# Patient Record
Sex: FEMALE | Race: BLACK OR AFRICAN AMERICAN | Employment: OTHER | URBAN - METROPOLITAN AREA
[De-identification: names, ages, dates, MRNs, and addresses within clinical notes are randomized per-mention and may not be internally consistent; named-entity substitution may affect disease eponyms.]

---

## 2019-03-27 ENCOUNTER — OFFICE VISIT (OUTPATIENT)
Dept: ORTHOPEDIC SURGERY | Age: 74
End: 2019-03-27

## 2019-03-27 VITALS
BODY MASS INDEX: 31.1 KG/M2 | HEIGHT: 62 IN | OXYGEN SATURATION: 98 % | SYSTOLIC BLOOD PRESSURE: 153 MMHG | WEIGHT: 169 LBS | DIASTOLIC BLOOD PRESSURE: 81 MMHG | HEART RATE: 67 BPM | TEMPERATURE: 98.2 F

## 2019-03-27 DIAGNOSIS — M17.11 PRIMARY OSTEOARTHRITIS OF RIGHT KNEE: Primary | ICD-10-CM

## 2019-03-27 RX ORDER — ATORVASTATIN CALCIUM 40 MG/1
40 TABLET, FILM COATED ORAL DAILY
COMMUNITY
Start: 2019-03-18

## 2019-03-27 RX ORDER — CETIRIZINE HCL 10 MG
10 TABLET ORAL
COMMUNITY
Start: 2007-12-28

## 2019-03-27 RX ORDER — OXYBUTYNIN CHLORIDE 5 MG/1
TABLET ORAL AS NEEDED
COMMUNITY
Start: 2019-01-04

## 2019-03-27 RX ORDER — PANTOPRAZOLE SODIUM 40 MG/1
40 TABLET, DELAYED RELEASE ORAL
COMMUNITY
Start: 2007-12-28

## 2019-03-27 RX ORDER — AZITHROMYCIN 250 MG/1
TABLET, FILM COATED ORAL
COMMUNITY
Start: 2019-01-09 | End: 2020-03-06

## 2019-03-27 RX ORDER — LOSARTAN POTASSIUM 50 MG/1
50 TABLET ORAL DAILY
COMMUNITY
Start: 2019-03-18

## 2019-03-27 RX ORDER — ERGOCALCIFEROL 1.25 MG/1
50000 CAPSULE ORAL
COMMUNITY

## 2019-03-27 RX ORDER — TRIAMCINOLONE ACETONIDE 40 MG/ML
40 INJECTION, SUSPENSION INTRA-ARTICULAR; INTRAMUSCULAR ONCE
Qty: 1 ML | Refills: 0
Start: 2019-03-27 | End: 2019-03-27

## 2019-03-27 RX ORDER — ETODOLAC 400 MG/1
400 TABLET, FILM COATED ORAL
COMMUNITY
Start: 2007-12-28 | End: 2020-03-06

## 2019-03-27 RX ORDER — FUROSEMIDE 20 MG/1
20 TABLET ORAL
COMMUNITY

## 2019-03-27 RX ORDER — MELOXICAM 7.5 MG/1
TABLET ORAL
COMMUNITY
Start: 2019-03-18 | End: 2020-03-06

## 2019-03-27 NOTE — PROGRESS NOTES
David Layne  1945   Chief Complaint   Patient presents with    Knee Pain     right        HISTORY OF PRESENT ILLNESS  David Layne is a 74 y.o. female who presents today for evaluation of right knee pain. She rates her pain 10/10 today. Pain has been present for 3 weeks, denies injury. She states she is walking with a limp. She reports a popping sensation at times in the knee. Patient describes the pain as throbbing that is Intermittent in nature. Symptoms are worse with walking and standing, Activity and is better with  Rest. Associated symptoms include Swelling. Since problem started, it: is unchanged. Pain does not wake patient up at night. Has taken no meds for the problem. Has tried following treatments: Injections:NO; Brace:NO;  Therapy:NO; Cane/Crutch:NO       No Known Allergies     Past Medical History:   Diagnosis Date    GERD (gastroesophageal reflux disease)     Hypercholesteremia       Social History     Socioeconomic History    Marital status: UNKNOWN     Spouse name: Not on file    Number of children: Not on file    Years of education: Not on file    Highest education level: Not on file   Occupational History    Not on file   Social Needs    Financial resource strain: Not on file    Food insecurity:     Worry: Not on file     Inability: Not on file    Transportation needs:     Medical: Not on file     Non-medical: Not on file   Tobacco Use    Smoking status: Never Smoker    Smokeless tobacco: Never Used   Substance and Sexual Activity    Alcohol use: Never     Frequency: Never    Drug use: Never    Sexual activity: Not on file   Lifestyle    Physical activity:     Days per week: Not on file     Minutes per session: Not on file    Stress: Not on file   Relationships    Social connections:     Talks on phone: Not on file     Gets together: Not on file     Attends Sabianist service: Not on file     Active member of club or organization: Not on file     Attends meetings of clubs or organizations: Not on file     Relationship status: Not on file    Intimate partner violence:     Fear of current or ex partner: Not on file     Emotionally abused: Not on file     Physically abused: Not on file     Forced sexual activity: Not on file   Other Topics Concern    Not on file   Social History Narrative    Not on file      Past Surgical History:   Procedure Laterality Date    HX HYSTERECTOMY        History reviewed. No pertinent family history. Current Outpatient Medications   Medication Sig    atorvastatin (LIPITOR) 40 mg tablet     azithromycin (ZITHROMAX) 250 mg tablet     etodolac (LODINE) 400 mg tablet Take 400 mg by mouth.  losartan (COZAAR) 50 mg tablet     meloxicam (MOBIC) 7.5 mg tablet     cetirizine (ZYRTEC) 10 mg tablet Take 10 mg by mouth.  oxybutynin (DITROPAN) 5 mg tablet     pantoprazole (PROTONIX) 40 mg tablet Take 40 mg by mouth.  furosemide (LASIX) 10 mg/mL oral solution Take  by mouth daily.  ergocalciferol (VITAMIN D2) 50,000 unit capsule Take 50,000 Units by mouth. No current facility-administered medications for this visit. REVIEW OF SYSTEM   Patient denies: Weight loss, Fever/Chills, HA, Visual changes, Fatigue, Chest pain, SOB, Abdominal pain, N/V/D/C, Blood in stool or urine, Edema. Pertinent positive as above in HPI. All others were negative    PHYSICAL EXAM:   Visit Vitals  /81   Pulse 67   Temp 98.2 °F (36.8 °C) (Oral)   Ht 5' 2\" (1.575 m)   Wt 169 lb (76.7 kg)   SpO2 98%   BMI 30.91 kg/m²     The patient is a well-developed, well-nourished female   in no acute distress. The patient is alert and oriented times three. The patient is alert and oriented times three. Mood and affect are normal.  LYMPHATIC: lymph nodes are not enlarged and are within normal limits  SKIN: normal in color and non tender to palpation. There are no bruises or abrasions noted. NEUROLOGICAL: Motor sensory exam is within normal limits. Reflexes are equal bilaterally. There is normal sensation to pinprick and light touch  MUSCULOSKELETAL:  Examination Right knee   Skin Intact   Range of motion 0-130   Effusion +   Medial joint line tenderness +   Lateral joint line tenderness -   Tenderness Pes Bursa -   Tenderness insertion MCL -   Tenderness insertion LCL -   Leifs -   Patella crepitus -   Patella grind -   Lachman -   Pivot shift -   Anterior drawer -   Posterior drawer -   Varus stress -   Valgus stress -   Neurovascular Intact   Calf Swelling and Tenderness to Palpation -   Rosalba's Test -   Hamstring Cord Tightness -     PROCEDURE: Right Knee Injection with Ultrasound Guidance  Indication:Right Knee pain/swelling    After sterile prep, 4 cc of Xylocaine and 1 cc of Kenalog were injected into the right knee. Ultrasound images captured using [employer] Ultrasound machine and scanned into patient's chart.        [facility]  OFFICE PROCEDURE PROGRESS NOTE        Chart reviewed for the following:  Deyanira Gonzales M.D, have reviewed the History, Physical and updated the Allergic reactions for [address] performed immediately prior to start of procedure:  Deyanira Gonzales M.D, have performed the following reviews on [place] [address] prior to the start of the procedure:            * Patient was identified by name and date of birth   * Agreement on procedure being performed was verified  * Risks and Benefits explained to the patient  * Procedure site verified and marked as necessary  * Patient was positioned for comfort  * Consent was signed and verified     Time: 9:53 AM     Date of procedure: 3/27/2019    Procedure performed by:  Sue Contreras M.D    Provider assisted by: (see medication administration)    How tolerated by patient: tolerated the procedure well with no complications    Comments: none    IMAGING: XR of right knee dated 3/15/19 was reviewed and read: IMPRESSION:  1. Negative for fracture. 2. Mild osteoarthritis of the right knee. IMPRESSION:      ICD-10-CM ICD-9-CM    1. Primary osteoarthritis of right knee M17.11 715.16 TRIAMCINOLONE ACETONIDE INJ      triamcinolone acetonide (KENALOG) 40 mg/mL injection      US GUIDE INJ/ASP/ARTHRO LG JNT/BURSA        PLAN:  1. The patient presents today with right knee pain due to osteoarthritis and I would like to try a cortisone injection today. Risk factors include: n/a  2. No ultrasound exam indicated today  3. Yes cortisone injection indicated today R KNEE US  4. No Physical/Occupational Therapy indicated today  5. No diagnostic test indicated today:   6. No durable medical equipment indicated today  7. No referral indicated today   8. No medications indicated today:   9. No Narcotic indicated today    RTC 3 weeks if pain continues    Scribed by Karen Harp ([address]) as dictated by Dasha Aviles MD    I, Dr. Dasha Aviles, confirm that all documentation is accurate.     Dasha Aviles M.D.   [facility]

## 2019-04-16 ENCOUNTER — OFFICE VISIT (OUTPATIENT)
Dept: ORTHOPEDIC SURGERY | Age: 74
End: 2019-04-16

## 2019-04-16 VITALS
BODY MASS INDEX: 31.47 KG/M2 | TEMPERATURE: 98 F | RESPIRATION RATE: 16 BRPM | DIASTOLIC BLOOD PRESSURE: 85 MMHG | SYSTOLIC BLOOD PRESSURE: 148 MMHG | HEIGHT: 62 IN | WEIGHT: 171 LBS | HEART RATE: 77 BPM | OXYGEN SATURATION: 98 %

## 2019-04-16 DIAGNOSIS — S83.8X1A MENISCAL INJURY, RIGHT, INITIAL ENCOUNTER: Primary | ICD-10-CM

## 2019-04-16 DIAGNOSIS — M17.11 PRIMARY OSTEOARTHRITIS OF RIGHT KNEE: ICD-10-CM

## 2019-04-16 NOTE — PROGRESS NOTES
Hans Cuevas7 1945 Chief Complaint Patient presents with  Knee Pain  
  right knee f/u HISTORY OF PRESENT ILLNESS Hans Pacheco is a 74 y.o. female who presents today for reevaluation of right knee pain. Patient rates pain as 6/10 today. At last OV, patient had a cortisone injection which provided some relief. Pain has been present for 6 weeks, denies injury. She states she is walking with a limp. She reports a popping sensation at times in the knee. Patient denies any fever, chills, chest pain, shortness of breath or calf pain. The remainder of the review of systems is negative. There are no new illness or injuries to report since last seen in the office. There are no changes to medications, allergies, family or social history. Pain Assessment  4/16/2019 Location of Pain Knee Location Modifiers Right Severity of Pain 6 Quality of Pain Dull;Aching Duration of Pain A few hours Frequency of Pain Intermittent Aggravating Factors Walking;Standing Limiting Behavior -  
Relieving Factors Rest;Elevation Result of Injury No  
 
PHYSICAL EXAM:  
Visit Vitals /85 Pulse 77 Temp 98 °F (36.7 °C) (Oral) Resp 16 Ht 5' 2\" (1.575 m) Wt 171 lb (77.6 kg) SpO2 98% BMI 31.28 kg/m² The patient is a well-developed, well-nourished female   in no acute distress. The patient is alert and oriented times three. The patient is alert and oriented times three. Mood and affect are normal. 
LYMPHATIC: lymph nodes are not enlarged and are within normal limits SKIN: normal in color and non tender to palpation. There are no bruises or abrasions noted. NEUROLOGICAL: Motor sensory exam is within normal limits. Reflexes are equal bilaterally. There is normal sensation to pinprick and light touch MUSCULOSKELETAL: 
Examination Right knee Skin Intact Range of motion 0-130 Effusion + Medial joint line tenderness + Lateral joint line tenderness -  
 Tenderness Pes Bursa - Tenderness insertion MCL - Tenderness insertion LCL - Leifs -  
Patella crepitus - Patella grind - Lachman - Pivot shift - Anterior drawer - Posterior drawer -  
Varus stress -  
Valgus stress - Neurovascular Intact Calf Swelling and Tenderness to Palpation -  
Rosalba's Test -  
Hamstring Cord Tightness -  
  
IMAGING: XR of right knee dated 3/15/19 was reviewed and read: IMPRESSION: 
1. Negative for fracture. 2. Mild osteoarthritis of the right knee. IMPRESSION:   
  ICD-10-CM ICD-9-CM 1. Meniscal injury, right, initial encounter S83. 8X1A 959.7 MRI KNEE RT WO CONT 2. Primary osteoarthritis of right knee M17.11 715.16 PLAN:  
1. The patient presents today with right knee pain not helped by the cortisone injection and I would like to get an MRI to r/o a meniscal tear. Risk factors include: n/a 2. No ultrasound exam indicated today 3. No cortisone injection indicated today 4. No Physical/Occupational Therapy indicated today 5. Yes diagnostic test indicated today: MRI R KNEE 6. No durable medical equipment indicated today 7. No referral indicated today 8. No medications indicated today: 9. No Narcotic indicated today RTC following MRI Scribed by Vania Rdz ([address]) as dictated by Sunshine Frye MD 
 
I, Dr. Sunshine Frye, confirm that all documentation is accurate.  
 
Sunshine Frye M.D.  
[facility]

## 2020-02-12 ENCOUNTER — OFFICE VISIT (OUTPATIENT)
Dept: ORTHOPEDIC SURGERY | Facility: CLINIC | Age: 75
End: 2020-02-12

## 2020-02-12 VITALS
RESPIRATION RATE: 18 BRPM | SYSTOLIC BLOOD PRESSURE: 130 MMHG | OXYGEN SATURATION: 98 % | BODY MASS INDEX: 31.8 KG/M2 | HEIGHT: 62 IN | WEIGHT: 172.8 LBS | HEART RATE: 71 BPM | DIASTOLIC BLOOD PRESSURE: 70 MMHG | TEMPERATURE: 98 F

## 2020-02-12 DIAGNOSIS — R22.32 SUBCUTANEOUS MASS OF LEFT THUMB: Primary | ICD-10-CM

## 2020-02-12 NOTE — PROGRESS NOTES
Gurwinder Morris is a 66 y.o. female right handed retiree. Worker's Compensation and legal considerations: none filed. Vitals:    02/12/20 1254   BP: 130/70   Pulse: 71   Resp: 18   Temp: 98 °F (36.7 °C)   TempSrc: Oral   SpO2: 98%   Weight: 172 lb 12.8 oz (78.4 kg)   Height: 5' 2\" (1.575 m)   PainSc:   0 - No pain   PainLoc: Hand           Chief Complaint   Patient presents with    Hand Pain     Left Thumb         HPI: Patient comes in today with complaints of a mass over her left thumb. She is unsure how long it is been there but she says she has had a similar mass in her genitalia as well as she notices one appearing on the inside of her lip. Date of onset:  indeterminate    Injury: No    Prior Treatment:  No    Numbness/ Tingling: No    ROS: Review of Systems - General ROS: negative  Respiratory ROS: no cough, shortness of breath, or wheezing  Cardiovascular ROS: no chest pain or dyspnea on exertion  Musculoskeletal ROS: positive for - mass left thumb  Neurological ROS: negative  Dermatological ROS: negative    Past Medical History:   Diagnosis Date    GERD (gastroesophageal reflux disease)     Hypercholesteremia     Hypertension        Past Surgical History:   Procedure Laterality Date    HX HYSTERECTOMY         Current Outpatient Medications   Medication Sig Dispense Refill    atorvastatin (LIPITOR) 40 mg tablet       losartan (COZAAR) 50 mg tablet       oxybutynin (DITROPAN) 5 mg tablet       pantoprazole (PROTONIX) 40 mg tablet Take 40 mg by mouth.  furosemide (LASIX) 10 mg/mL oral solution Take  by mouth daily.  ergocalciferol (VITAMIN D2) 50,000 unit capsule Take 50,000 Units by mouth.  azithromycin (ZITHROMAX) 250 mg tablet       etodolac (LODINE) 400 mg tablet Take 400 mg by mouth.  meloxicam (MOBIC) 7.5 mg tablet       cetirizine (ZYRTEC) 10 mg tablet Take 10 mg by mouth.          No Known Allergies      PE:     Left Hand: There is a round wartlike appearing mass over the dorsal aspect of the thumb. Deep to this there appears to be another mass which may be cystic in nature versus solid. Imaging:     Plain films of left thumb does not show any osseous abnormality although there are degenerative changes noted at the thumb [facility]. ICD-10-CM ICD-9-CM    1. Subcutaneous mass of left thumb R22.32 782.2 AMB POC XRAY, FINGER(S), 2+ VIEWS       Plan: At this point we will set her up for a left thumb mass excision pending any medical clearances or labs. Follow-up 2 weeks postop.     Plan was reviewed with patient, who verbalized agreement and understanding of the plan

## 2020-02-25 ENCOUNTER — HOSPITAL ENCOUNTER (OUTPATIENT)
Dept: LAB | Age: 75
Discharge: HOME | End: 2020-02-25
Payer: MEDICARE

## 2020-02-25 DIAGNOSIS — Z01.818 PREOP EXAMINATION: ICD-10-CM

## 2020-02-25 DIAGNOSIS — Z01.818 PREOP EXAMINATION: Primary | ICD-10-CM

## 2020-02-25 LAB
ALBUMIN SERPL-MCNC: 3.8 G/DL (ref 3.4–5)
ALBUMIN/GLOB SERPL: 1.2 {RATIO} (ref 0.8–1.7)
ALP SERPL-CCNC: 109 U/L (ref 45–117)
ALT SERPL-CCNC: 22 U/L (ref 13–56)
ANION GAP SERPL CALC-SCNC: 4 MMOL/L (ref 3–18)
AST SERPL-CCNC: 18 U/L (ref 10–38)
BASOPHILS # BLD: 0 K/UL (ref 0–0.1)
BASOPHILS NFR BLD: 0 % (ref 0–2)
BILIRUB SERPL-MCNC: 0.5 MG/DL (ref 0.2–1)
BUN SERPL-MCNC: 14 MG/DL (ref 7–18)
BUN/CREAT SERPL: 16 (ref 12–20)
CALCIUM SERPL-MCNC: 9.4 MG/DL (ref 8.5–10.1)
CHLORIDE SERPL-SCNC: 110 MMOL/L (ref 100–111)
CO2 SERPL-SCNC: 29 MMOL/L (ref 21–32)
CREAT SERPL-MCNC: 0.89 MG/DL (ref 0.6–1.3)
DIFFERENTIAL METHOD BLD: ABNORMAL
EOSINOPHIL # BLD: 0 K/UL (ref 0–0.4)
EOSINOPHIL NFR BLD: 1 % (ref 0–5)
ERYTHROCYTE [DISTWIDTH] IN BLOOD BY AUTOMATED COUNT: 14.3 % (ref 11.6–14.5)
GLOBULIN SER CALC-MCNC: 3.2 G/DL (ref 2–4)
GLUCOSE SERPL-MCNC: 76 MG/DL (ref 74–99)
HCT VFR BLD AUTO: 38.8 % (ref 35–45)
HGB BLD-MCNC: 12.7 G/DL (ref 12–16)
LYMPHOCYTES # BLD: 2.2 K/UL (ref 0.9–3.6)
LYMPHOCYTES NFR BLD: 34 % (ref 21–52)
MCH RBC QN AUTO: 31 PG (ref 24–34)
MCHC RBC AUTO-ENTMCNC: 32.7 G/DL (ref 31–37)
MCV RBC AUTO: 94.6 FL (ref 74–97)
MONOCYTES # BLD: 0.4 K/UL (ref 0.05–1.2)
MONOCYTES NFR BLD: 6 % (ref 3–10)
NEUTS SEG # BLD: 3.9 K/UL (ref 1.8–8)
NEUTS SEG NFR BLD: 59 % (ref 40–73)
PLATELET # BLD AUTO: 258 K/UL (ref 135–420)
PMV BLD AUTO: 11.1 FL (ref 9.2–11.8)
POTASSIUM SERPL-SCNC: 4.3 MMOL/L (ref 3.5–5.5)
PROT SERPL-MCNC: 7 G/DL (ref 6.4–8.2)
RBC # BLD AUTO: 4.1 M/UL (ref 4.2–5.3)
SODIUM SERPL-SCNC: 143 MMOL/L (ref 136–145)
WBC # BLD AUTO: 6.6 K/UL (ref 4.6–13.2)

## 2020-02-25 PROCEDURE — 85025 COMPLETE CBC W/AUTO DIFF WBC: CPT

## 2020-02-25 PROCEDURE — 80053 COMPREHEN METABOLIC PANEL: CPT

## 2020-02-25 PROCEDURE — 36415 COLL VENOUS BLD VENIPUNCTURE: CPT

## 2020-03-09 NOTE — H&P
Brooks Chandler is a 66 y.o. female right handed retiree. Worker's Compensation and legal considerations: none filed.         Vitals:     02/12/20 1254   BP: 130/70   Pulse: 71   Resp: 18   Temp: 98 °F (36.7 °C)   TempSrc: Oral   SpO2: 98%   Weight: 172 lb 12.8 oz (78.4 kg)   Height: 5' 2\" (1.575 m)   PainSc:   0 - No pain   PainLoc: Hand                    Chief Complaint   Patient presents with    Hand Pain       Left Thumb            HPI: Patient comes in today with complaints of a mass over her left thumb. She is unsure how long it is been there but she says she has had a similar mass in her genitalia as well as she notices one appearing on the inside of her lip.     Date of onset:  indeterminate     Injury: No     Prior Treatment:  No     Numbness/ Tingling:  No     ROS: Review of Systems - General ROS: negative  Respiratory ROS: no cough, shortness of breath, or wheezing  Cardiovascular ROS: no chest pain or dyspnea on exertion  Musculoskeletal ROS: positive for - mass left thumb  Neurological ROS: negative  Dermatological ROS: negative          Past Medical History:   Diagnosis Date    GERD (gastroesophageal reflux disease)      Hypercholesteremia      Hypertension           Surgical History         Past Surgical History:   Procedure Laterality Date    HX HYSTERECTOMY                       Current Outpatient Medications   Medication Sig Dispense Refill    atorvastatin (LIPITOR) 40 mg tablet          losartan (COZAAR) 50 mg tablet          oxybutynin (DITROPAN) 5 mg tablet          pantoprazole (PROTONIX) 40 mg tablet Take 40 mg by mouth.        furosemide (LASIX) 10 mg/mL oral solution Take  by mouth daily.        ergocalciferol (VITAMIN D2) 50,000 unit capsule Take 50,000 Units by mouth.        azithromycin (ZITHROMAX) 250 mg tablet          etodolac (LODINE) 400 mg tablet Take 400 mg by mouth.        meloxicam (MOBIC) 7.5 mg tablet          cetirizine (ZYRTEC) 10 mg tablet Take 10 mg by mouth.             No Known Allergies        PE:     Physical Exam  Constitutional:       General: She is not in acute distress. Appearance: Normal appearance. She is not ill-appearing, toxic-appearing or diaphoretic. HENT:      Head: Normocephalic and atraumatic. Nose: Nose normal.   Eyes:      Pupils: Pupils are equal, round, and reactive to light. Neck:      Musculoskeletal: Normal range of motion and neck supple. Cardiovascular:      Pulses: Normal pulses. Pulmonary:      Effort: Pulmonary effort is normal. No respiratory distress. Abdominal:      General: Abdomen is flat. There is no distension. Musculoskeletal: Normal range of motion. General: Tenderness and deformity present. No swelling or signs of injury. Right lower leg: No edema. Left lower leg: No edema. Skin:     General: Skin is warm. Capillary Refill: Capillary refill takes less than 2 seconds. Coloration: Skin is not jaundiced or pale. Findings: No bruising, erythema, lesion or rash. Neurological:      General: No focal deficit present. Mental Status: She is alert and oriented to person, place, and time. Mental status is at baseline. Cranial Nerves: No cranial nerve deficit. Sensory: No sensory deficit. Motor: No weakness. Coordination: Coordination normal.      Gait: Gait normal.      Deep Tendon Reflexes: Reflexes normal.   Psychiatric:         Mood and Affect: Mood normal.         Behavior: Behavior normal.         Thought Content: Thought content normal.         Judgment: Judgment normal.          Left Hand: There is a round wartlike appearing mass over the dorsal aspect of the thumb. Deep to this there appears to be another mass which may be cystic in nature versus solid.       Imaging:      Plain films of left thumb does not show any osseous abnormality although there are degenerative changes noted at the thumb [facility].               ICD-10-CM ICD-9-CM     1. Subcutaneous mass of left thumb R22.32 782.2 AMB POC XRAY, FINGER(S), 2+ VIEWS         Plan:      At this point we will set her up for a left thumb mass excision pending any medical clearances or labs.     Follow-up 2 weeks postop.     Plan was reviewed with patient, who verbalized agreement and understanding of the plan

## 2020-03-10 ENCOUNTER — HOSPITAL ENCOUNTER (OUTPATIENT)
Age: 75
Setting detail: OUTPATIENT SURGERY
Discharge: HOME | End: 2020-03-10
Attending: ORTHOPAEDIC SURGERY | Admitting: ORTHOPAEDIC SURGERY
Payer: MEDICARE

## 2020-03-10 VITALS — BODY MASS INDEX: 31.28 KG/M2 | WEIGHT: 170 LBS | HEIGHT: 62 IN

## 2020-03-10 PROCEDURE — 74011250636 HC RX REV CODE- 250/636: Performed by: NURSE ANESTHETIST, CERTIFIED REGISTERED

## 2020-03-10 RX ORDER — SODIUM CHLORIDE 0.9 % (FLUSH) 0.9 %
5-40 SYRINGE (ML) INJECTION EVERY 8 HOURS
Status: DISCONTINUED | OUTPATIENT
Start: 2020-03-10 | End: 2020-03-10 | Stop reason: HOSPADM

## 2020-03-10 RX ORDER — LIDOCAINE HYDROCHLORIDE 10 MG/ML
0.1 INJECTION, SOLUTION EPIDURAL; INFILTRATION; INTRACAUDAL; PERINEURAL AS NEEDED
Status: DISCONTINUED | OUTPATIENT
Start: 2020-03-10 | End: 2020-03-10 | Stop reason: HOSPADM

## 2020-03-10 RX ORDER — SODIUM CHLORIDE 0.9 % (FLUSH) 0.9 %
5-40 SYRINGE (ML) INJECTION AS NEEDED
Status: DISCONTINUED | OUTPATIENT
Start: 2020-03-10 | End: 2020-03-10 | Stop reason: HOSPADM

## 2020-03-10 RX ORDER — CEFAZOLIN SODIUM 2 G/50ML
2 SOLUTION INTRAVENOUS ONCE
Status: DISCONTINUED | OUTPATIENT
Start: 2020-03-10 | End: 2020-03-10 | Stop reason: HOSPADM

## 2020-03-10 RX ORDER — FAMOTIDINE 20 MG/1
20 TABLET, FILM COATED ORAL ONCE
Status: DISCONTINUED | OUTPATIENT
Start: 2020-03-10 | End: 2020-03-10 | Stop reason: HOSPADM

## 2020-03-10 RX ORDER — INSULIN LISPRO 100 [IU]/ML
INJECTION, SOLUTION INTRAVENOUS; SUBCUTANEOUS ONCE
Status: DISCONTINUED | OUTPATIENT
Start: 2020-03-10 | End: 2020-03-10 | Stop reason: HOSPADM

## 2020-03-10 RX ORDER — SODIUM CHLORIDE, SODIUM LACTATE, POTASSIUM CHLORIDE, CALCIUM CHLORIDE 600; 310; 30; 20 MG/100ML; MG/100ML; MG/100ML; MG/100ML
75 INJECTION, SOLUTION INTRAVENOUS CONTINUOUS
Status: DISCONTINUED | OUTPATIENT
Start: 2020-03-10 | End: 2020-03-10 | Stop reason: HOSPADM

## 2020-03-10 NOTE — PROGRESS NOTES
Pt's surgery was cancelled due to elevated temperature this morning. Pt was instructed to see her PCP and reschedule procedure when she is better.

## 2020-03-10 NOTE — DISCHARGE INSTRUCTIONS
DISCHARGE SUMMARY from Nurse            PATIENT INSTRUCTIONS: Please reschedule surgery with Dr. Shine Nickerson  once patient has visited PCP, ER,or urgent care              These are general instructions for a healthy lifestyle:    No smoking/ No tobacco products/ Avoid exposure to second hand smoke    Surgeon General's Warning:  Quitting smoking now greatly reduces serious risk to your health. Obesity, smoking, and sedentary lifestyle greatly increases your risk for illness    A healthy diet, regular physical exercise & weight monitoring are important for maintaining a healthy lifestyle    You may be retaining fluid if you have a history of heart failure or if you experience any of the following symptoms:  Weight gain of 3 pounds or more overnight or 5 pounds in a week, increased swelling in our hands or feet or shortness of breath while lying flat in bed. Please call your doctor as soon as you notice any of these symptoms; do not wait until your next office visit. Recognize signs and symptoms of STROKE:    F-face looks uneven    A-arms unable to move or move unevenly    S-speech slurred or non-existent    T-time-call 911 as soon as signs and symptoms begin-DO NOT go       Back to bed or wait to see if you get better-TIME IS BRAIN. Warning Signs of HEART ATTACK     Call 911 if you have these symptoms:   Chest discomfort. Most heart attacks involve discomfort in the center of the chest that lasts more than a few minutes, or that goes away and comes back. It can feel like uncomfortable pressure, squeezing, fullness, or pain.  Discomfort in other areas of the upper body. Symptoms can include pain or discomfort in one or both arms, the back, neck, jaw, or stomach.  Shortness of breath with or without chest discomfort.  Other signs may include breaking out in a cold sweat, nausea, or lightheadedness. Don't wait more than five minutes to call 911 - MINUTES MATTER! Fast action can save your life.  Calling 911 is almost always the fastest way to get lifesaving treatment. Emergency Medical Services staff can begin treatment when they arrive -- up to an hour sooner than if someone gets to the hospital by car. The discharge information has been reviewed with the patient. The patient verbalized understanding.     Patient armband removed and shredded

## 2020-06-05 DIAGNOSIS — Z01.818 PREOP EXAMINATION: Primary | ICD-10-CM

## 2020-06-10 ENCOUNTER — OFFICE VISIT (OUTPATIENT)
Dept: ORTHOPEDIC SURGERY | Facility: CLINIC | Age: 75
End: 2020-06-10

## 2020-06-10 VITALS
HEART RATE: 71 BPM | SYSTOLIC BLOOD PRESSURE: 143 MMHG | HEIGHT: 62 IN | TEMPERATURE: 97.4 F | BODY MASS INDEX: 32.24 KG/M2 | WEIGHT: 175.2 LBS | DIASTOLIC BLOOD PRESSURE: 76 MMHG

## 2020-06-10 DIAGNOSIS — R22.32 SUBCUTANEOUS MASS OF LEFT THUMB: Primary | ICD-10-CM

## 2020-06-10 NOTE — PROGRESS NOTES
Chapito Devries is a 66 y.o. female right handed retiree. Worker's Compensation and legal considerations: none filed. Vitals:    06/10/20 1106   BP: 143/76   Pulse: 71   Temp: 97.4 °F (36.3 °C)   Weight: 175 lb 3.2 oz (79.5 kg)   Height: 5' 2\" (1.575 m)   PainSc:   0 - No pain   PainLoc: Hand           Chief Complaint   Patient presents with    Hand Pain     Lt     HPI: Patient comes in today for preoperative history and physical of her left thumb mass. She reports no change. Initial HPI: Patient comes in today with complaints of a mass over her left thumb. She is unsure how long it is been there but she says she has had a similar mass in her genitalia as well as she notices one appearing on the inside of her lip. Date of onset:  indeterminate    Injury: No    Prior Treatment:  No    Numbness/ Tingling: No    ROS: Review of Systems - General ROS: negative  Respiratory ROS: no cough, shortness of breath, or wheezing  Cardiovascular ROS: no chest pain or dyspnea on exertion  Musculoskeletal ROS: positive for - mass left thumb  Neurological ROS: negative  Dermatological ROS: negative    Past Medical History:   Diagnosis Date    GERD (gastroesophageal reflux disease)     Hypercholesteremia     Hypertension        Past Surgical History:   Procedure Laterality Date    HX HYSTERECTOMY         Current Outpatient Medications   Medication Sig Dispense Refill    atorvastatin (LIPITOR) 40 mg tablet 40 mg daily.  losartan (COZAAR) 50 mg tablet 50 mg daily.  cetirizine (ZYRTEC) 10 mg tablet Take 10 mg by mouth daily as needed.  oxybutynin (DITROPAN) 5 mg tablet as needed.  pantoprazole (PROTONIX) 40 mg tablet Take 40 mg by mouth daily as needed.  furosemide (LASIX) 20 mg tablet Take 20 mg by mouth daily as needed.  ergocalciferol (VITAMIN D2) 50,000 unit capsule Take 50,000 Units by mouth every seven (7) days.          No Known Allergies      PE:     Physical Exam  Vitals signs and nursing note reviewed. Constitutional:       Appearance: Normal appearance. She is normal weight. HENT:      Head: Normocephalic and atraumatic. Nose: Nose normal.      Mouth/Throat:      Mouth: Mucous membranes are moist.   Eyes:      Extraocular Movements: Extraocular movements intact. Pupils: Pupils are equal, round, and reactive to light. Neck:      Musculoskeletal: Normal range of motion. Cardiovascular:      Pulses: Normal pulses. Pulmonary:      Effort: Pulmonary effort is normal. No respiratory distress. Abdominal:      General: Abdomen is flat. There is no distension. Musculoskeletal: Normal range of motion. General: No swelling, tenderness, deformity or signs of injury. Right lower leg: No edema. Left lower leg: No edema. Skin:     General: Skin is warm. Capillary Refill: Capillary refill takes less than 2 seconds. Coloration: Skin is not jaundiced. Neurological:      General: No focal deficit present. Mental Status: She is alert and oriented to person, place, and time. Mental status is at baseline. Psychiatric:         Mood and Affect: Mood normal.         Behavior: Behavior normal.         Left Hand: Again there is a wartlike appearing mass on the dorsal aspect of the thumb. It is nontender to palpation. Sensation is intact distally and capillary refill is brisk. Imaging:     Previous Plain films of left thumb does not show any osseous abnormality although there are degenerative changes noted at the thumb [facility]. ICD-10-CM ICD-9-CM    1. Subcutaneous mass of left thumb R22.32 782.2        Plan: We will proceed as scheduled for left thumb mass excision. This procedure has been fully reviewed with the patient and written informed consent has been obtained. The patient was counseled at length about the risks of nicole Covid-19 during their perioperative period and any recovery window from their procedure.   The patient was made aware that nicole Covid-19  may worsen their prognosis for recovering from their procedure and lend to a higher morbidity and/or mortality risk. All material risks, benefits, and reasonable alternatives including postponing the procedure were discussed. The patient does  wish to proceed with the procedure at this time. Follow-up and Dispositions    · Return for 2 weeks postop.          Plan was reviewed with patient, who verbalized agreement and understanding of the plan

## 2020-06-11 ENCOUNTER — HOSPITAL ENCOUNTER (OUTPATIENT)
Dept: PREADMISSION TESTING | Age: 75
Discharge: HOME | End: 2020-06-11
Payer: MEDICARE

## 2020-06-11 DIAGNOSIS — Z01.818 PREOP EXAMINATION: ICD-10-CM

## 2020-06-11 LAB
ALBUMIN SERPL-MCNC: 3.9 G/DL (ref 3.4–5)
ALBUMIN/GLOB SERPL: 1.1 {RATIO} (ref 0.8–1.7)
ALP SERPL-CCNC: 120 U/L (ref 45–117)
ALT SERPL-CCNC: 30 U/L (ref 13–56)
ANION GAP SERPL CALC-SCNC: 4 MMOL/L (ref 3–18)
AST SERPL-CCNC: 22 U/L (ref 10–38)
BASOPHILS # BLD: 0 K/UL (ref 0–0.1)
BASOPHILS NFR BLD: 0 % (ref 0–2)
BILIRUB SERPL-MCNC: 0.7 MG/DL (ref 0.2–1)
BUN SERPL-MCNC: 13 MG/DL (ref 7–18)
BUN/CREAT SERPL: 15 (ref 12–20)
CALCIUM SERPL-MCNC: 9.1 MG/DL (ref 8.5–10.1)
CHLORIDE SERPL-SCNC: 109 MMOL/L (ref 100–111)
CO2 SERPL-SCNC: 30 MMOL/L (ref 21–32)
CREAT SERPL-MCNC: 0.86 MG/DL (ref 0.6–1.3)
DIFFERENTIAL METHOD BLD: ABNORMAL
EOSINOPHIL # BLD: 0.1 K/UL (ref 0–0.4)
EOSINOPHIL NFR BLD: 1 % (ref 0–5)
ERYTHROCYTE [DISTWIDTH] IN BLOOD BY AUTOMATED COUNT: 14.8 % (ref 11.6–14.5)
GLOBULIN SER CALC-MCNC: 3.4 G/DL (ref 2–4)
GLUCOSE SERPL-MCNC: 90 MG/DL (ref 74–99)
HCT VFR BLD AUTO: 38 % (ref 35–45)
HGB BLD-MCNC: 12.7 G/DL (ref 12–16)
LYMPHOCYTES # BLD: 2 K/UL (ref 0.9–3.6)
LYMPHOCYTES NFR BLD: 36 % (ref 21–52)
MCH RBC QN AUTO: 31.2 PG (ref 24–34)
MCHC RBC AUTO-ENTMCNC: 33.4 G/DL (ref 31–37)
MCV RBC AUTO: 93.4 FL (ref 74–97)
MONOCYTES # BLD: 0.4 K/UL (ref 0.05–1.2)
MONOCYTES NFR BLD: 6 % (ref 3–10)
NEUTS SEG # BLD: 3.2 K/UL (ref 1.8–8)
NEUTS SEG NFR BLD: 57 % (ref 40–73)
PLATELET # BLD AUTO: 250 K/UL (ref 135–420)
PMV BLD AUTO: 10.9 FL (ref 9.2–11.8)
POTASSIUM SERPL-SCNC: 4 MMOL/L (ref 3.5–5.5)
PROT SERPL-MCNC: 7.3 G/DL (ref 6.4–8.2)
RBC # BLD AUTO: 4.07 M/UL (ref 4.2–5.3)
SODIUM SERPL-SCNC: 143 MMOL/L (ref 136–145)
WBC # BLD AUTO: 5.6 K/UL (ref 4.6–13.2)

## 2020-06-11 PROCEDURE — 36415 COLL VENOUS BLD VENIPUNCTURE: CPT

## 2020-06-11 PROCEDURE — 93005 ELECTROCARDIOGRAM TRACING: CPT

## 2020-06-11 PROCEDURE — 80053 COMPREHEN METABOLIC PANEL: CPT

## 2020-06-11 PROCEDURE — 85025 COMPLETE CBC W/AUTO DIFF WBC: CPT

## 2020-06-11 PROCEDURE — 87635 SARS-COV-2 COVID-19 AMP PRB: CPT

## 2020-06-12 LAB
ATRIAL RATE: 66 BPM
CALCULATED P AXIS, ECG09: 30 DEGREES
CALCULATED R AXIS, ECG10: 4 DEGREES
CALCULATED T AXIS, ECG11: 177 DEGREES
DIAGNOSIS, 93000: NORMAL
P-R INTERVAL, ECG05: 168 MS
Q-T INTERVAL, ECG07: 376 MS
QRS DURATION, ECG06: 68 MS
QTC CALCULATION (BEZET), ECG08: 394 MS
SARS-COV-2, COV2NT: NOT DETECTED
VENTRICULAR RATE, ECG03: 66 BPM

## 2020-06-15 ENCOUNTER — ANESTHESIA EVENT (OUTPATIENT)
Dept: SURGERY | Age: 75
End: 2020-06-15
Payer: MEDICARE

## 2020-06-15 NOTE — H&P
[]Hide copied text    []Carmen for details  Chapito Devries is a 66 y.o. female right handed retiree. Worker's Compensation and legal considerations: none filed.         Vitals:     06/10/20 1106   BP: 143/76   Pulse: 71   Temp: 97.4 °F (36.3 °C)   Weight: 175 lb 3.2 oz (79.5 kg)   Height: 5' 2\" (1.575 m)   PainSc:   0 - No pain   PainLoc: Hand                    Chief Complaint   Patient presents with    Hand Pain       Lt      HPI: Patient comes in today for preoperative history and physical of her left thumb mass. She reports no change.     Initial HPI: Patient comes in today with complaints of a mass over her left thumb. She is unsure how long it is been there but she says she has had a similar mass in her genitalia as well as she notices one appearing on the inside of her lip.     Date of onset:  indeterminate     Injury: No     Prior Treatment:  No     Numbness/ Tingling:  No     ROS: Review of Systems - General ROS: negative  Respiratory ROS: no cough, shortness of breath, or wheezing  Cardiovascular ROS: no chest pain or dyspnea on exertion  Musculoskeletal ROS: positive for - mass left thumb  Neurological ROS: negative  Dermatological ROS: negative          Past Medical History:   Diagnosis Date    GERD (gastroesophageal reflux disease)      Hypercholesteremia      Hypertension           Surgical History         Past Surgical History:   Procedure Laterality Date    HX HYSTERECTOMY                       Current Outpatient Medications   Medication Sig Dispense Refill    atorvastatin (LIPITOR) 40 mg tablet 40 mg daily.        losartan (COZAAR) 50 mg tablet 50 mg daily.        cetirizine (ZYRTEC) 10 mg tablet Take 10 mg by mouth daily as needed.        oxybutynin (DITROPAN) 5 mg tablet as needed.        pantoprazole (PROTONIX) 40 mg tablet Take 40 mg by mouth daily as needed.        furosemide (LASIX) 20 mg tablet Take 20 mg by mouth daily as needed.        ergocalciferol (VITAMIN D2) 50,000 unit capsule Take 50,000 Units by mouth every seven (7) days.             No Known Allergies        PE:      Physical Exam  Vitals signs and nursing note reviewed. Constitutional:       Appearance: Normal appearance. She is normal weight. HENT:      Head: Normocephalic and atraumatic. Nose: Nose normal.      Mouth/Throat:      Mouth: Mucous membranes are moist.   Eyes:      Extraocular Movements: Extraocular movements intact. Pupils: Pupils are equal, round, and reactive to light. Neck:      Musculoskeletal: Normal range of motion. Cardiovascular:      Pulses: Normal pulses. Pulmonary:      Effort: Pulmonary effort is normal. No respiratory distress. Abdominal:      General: Abdomen is flat. There is no distension. Musculoskeletal: Normal range of motion. General: No swelling, tenderness, deformity or signs of injury. Right lower leg: No edema. Left lower leg: No edema. Skin:     General: Skin is warm. Capillary Refill: Capillary refill takes less than 2 seconds. Coloration: Skin is not jaundiced. Neurological:      General: No focal deficit present. Mental Status: She is alert and oriented to person, place, and time. Mental status is at baseline. Psychiatric:         Mood and Affect: Mood normal.         Behavior: Behavior normal.            Left Hand: Again there is a wartlike appearing mass on the dorsal aspect of the thumb. It is nontender to palpation. Sensation is intact distally and capillary refill is brisk.     Imaging:      Previous Plain films of left thumb does not show any osseous abnormality although there are degenerative changes noted at the thumb [facility].               ICD-10-CM ICD-9-CM     1. Subcutaneous mass of left thumb R22.32 782. 2           Plan:      We will proceed as scheduled for left thumb mass excision.     This procedure has been fully reviewed with the patient and written informed consent has been obtained.     The patient was counseled at length about the risks of nicole Covid-19 during their perioperative period and any recovery window from their procedure.  The patient was made aware that nicole Covid-19  may worsen their prognosis for recovering from their procedure and lend to a higher morbidity and/or mortality risk.  All material risks, benefits, and reasonable alternatives including postponing the procedure were discussed.  The patient does  wish to proceed with the procedure at this time.           Follow-up and Dispositions    · Return for 2 weeks postop.            Plan was reviewed with patient, who verbalized agreement and understanding of the plan

## 2020-06-16 ENCOUNTER — ANESTHESIA (OUTPATIENT)
Dept: SURGERY | Age: 75
End: 2020-06-16
Payer: MEDICARE

## 2020-06-16 ENCOUNTER — HOSPITAL ENCOUNTER (OUTPATIENT)
Age: 75
Setting detail: OUTPATIENT SURGERY
Discharge: HOME | End: 2020-06-16
Attending: ORTHOPAEDIC SURGERY | Admitting: ORTHOPAEDIC SURGERY
Payer: MEDICARE

## 2020-06-16 VITALS
HEIGHT: 62 IN | HEART RATE: 64 BPM | DIASTOLIC BLOOD PRESSURE: 81 MMHG | SYSTOLIC BLOOD PRESSURE: 150 MMHG | WEIGHT: 176 LBS | TEMPERATURE: 97 F | OXYGEN SATURATION: 94 % | RESPIRATION RATE: 20 BRPM | BODY MASS INDEX: 32.39 KG/M2

## 2020-06-16 DIAGNOSIS — R22.32 SUBCUTANEOUS MASS OF LEFT THUMB: Primary | ICD-10-CM

## 2020-06-16 PROCEDURE — 77030010813: Performed by: ORTHOPAEDIC SURGERY

## 2020-06-16 PROCEDURE — 77030040356 HC CORD BPLR FRCP COVD -A: Performed by: ORTHOPAEDIC SURGERY

## 2020-06-16 PROCEDURE — 74011250637 HC RX REV CODE- 250/637: Performed by: NURSE ANESTHETIST, CERTIFIED REGISTERED

## 2020-06-16 PROCEDURE — 76010000138 HC OR TIME 0.5 TO 1 HR: Performed by: ORTHOPAEDIC SURGERY

## 2020-06-16 PROCEDURE — 76060000032 HC ANESTHESIA 0.5 TO 1 HR: Performed by: ORTHOPAEDIC SURGERY

## 2020-06-16 PROCEDURE — 77030000032 HC CUF TRNQT ZIMM -B: Performed by: ORTHOPAEDIC SURGERY

## 2020-06-16 PROCEDURE — 77030018836 HC SOL IRR NACL ICUM -A: Performed by: ORTHOPAEDIC SURGERY

## 2020-06-16 PROCEDURE — 88305 TISSUE EXAM BY PATHOLOGIST: CPT

## 2020-06-16 PROCEDURE — 88342 IMHCHEM/IMCYTCHM 1ST ANTB: CPT

## 2020-06-16 PROCEDURE — 76210000063 HC OR PH I REC FIRST 0.5 HR: Performed by: ORTHOPAEDIC SURGERY

## 2020-06-16 PROCEDURE — 77030040361 HC SLV COMPR DVT MDII -B: Performed by: ORTHOPAEDIC SURGERY

## 2020-06-16 PROCEDURE — 74011250636 HC RX REV CODE- 250/636: Performed by: ANESTHESIOLOGY

## 2020-06-16 PROCEDURE — 74011250636 HC RX REV CODE- 250/636: Performed by: NURSE ANESTHETIST, CERTIFIED REGISTERED

## 2020-06-16 PROCEDURE — 77030040922 HC BLNKT HYPOTHRM STRY -A: Performed by: ORTHOPAEDIC SURGERY

## 2020-06-16 PROCEDURE — 76210000021 HC REC RM PH II 0.5 TO 1 HR: Performed by: ORTHOPAEDIC SURGERY

## 2020-06-16 PROCEDURE — 77030002888 HC SUT CHRMC J&J -A: Performed by: ORTHOPAEDIC SURGERY

## 2020-06-16 PROCEDURE — 74011000250 HC RX REV CODE- 250: Performed by: ORTHOPAEDIC SURGERY

## 2020-06-16 RX ORDER — SODIUM CHLORIDE 0.9 % (FLUSH) 0.9 %
5-40 SYRINGE (ML) INJECTION AS NEEDED
Status: DISCONTINUED | OUTPATIENT
Start: 2020-06-16 | End: 2020-06-16 | Stop reason: HOSPADM

## 2020-06-16 RX ORDER — ONDANSETRON 2 MG/ML
INJECTION INTRAMUSCULAR; INTRAVENOUS AS NEEDED
Status: DISCONTINUED | OUTPATIENT
Start: 2020-06-16 | End: 2020-06-16 | Stop reason: HOSPADM

## 2020-06-16 RX ORDER — CEFAZOLIN SODIUM 1 G/3ML
INJECTION, POWDER, FOR SOLUTION INTRAMUSCULAR; INTRAVENOUS AS NEEDED
Status: DISCONTINUED | OUTPATIENT
Start: 2020-06-16 | End: 2020-06-16 | Stop reason: HOSPADM

## 2020-06-16 RX ORDER — CEFAZOLIN SODIUM 2 G/50ML
2 SOLUTION INTRAVENOUS ONCE
Status: DISCONTINUED | OUTPATIENT
Start: 2020-06-16 | End: 2020-06-16 | Stop reason: HOSPADM

## 2020-06-16 RX ORDER — SODIUM CHLORIDE 0.9 % (FLUSH) 0.9 %
5-40 SYRINGE (ML) INJECTION EVERY 8 HOURS
Status: DISCONTINUED | OUTPATIENT
Start: 2020-06-16 | End: 2020-06-16 | Stop reason: HOSPADM

## 2020-06-16 RX ORDER — LIDOCAINE HYDROCHLORIDE 10 MG/ML
0.1 INJECTION, SOLUTION EPIDURAL; INFILTRATION; INTRACAUDAL; PERINEURAL AS NEEDED
Status: DISCONTINUED | OUTPATIENT
Start: 2020-06-16 | End: 2020-06-16 | Stop reason: HOSPADM

## 2020-06-16 RX ORDER — HYDROMORPHONE HYDROCHLORIDE 2 MG/ML
0.5 INJECTION, SOLUTION INTRAMUSCULAR; INTRAVENOUS; SUBCUTANEOUS
Status: DISCONTINUED | OUTPATIENT
Start: 2020-06-16 | End: 2020-06-16 | Stop reason: HOSPADM

## 2020-06-16 RX ORDER — DEXTROSE 50 % IN WATER (D50W) INTRAVENOUS SYRINGE
25-50 AS NEEDED
Status: DISCONTINUED | OUTPATIENT
Start: 2020-06-16 | End: 2020-06-16 | Stop reason: HOSPADM

## 2020-06-16 RX ORDER — FENTANYL CITRATE 50 UG/ML
50 INJECTION, SOLUTION INTRAMUSCULAR; INTRAVENOUS
Status: DISCONTINUED | OUTPATIENT
Start: 2020-06-16 | End: 2020-06-16 | Stop reason: HOSPADM

## 2020-06-16 RX ORDER — SODIUM CHLORIDE 9 MG/ML
INJECTION, SOLUTION INTRAVENOUS
Status: DISCONTINUED | OUTPATIENT
Start: 2020-06-16 | End: 2020-06-16 | Stop reason: HOSPADM

## 2020-06-16 RX ORDER — SODIUM CHLORIDE, SODIUM LACTATE, POTASSIUM CHLORIDE, CALCIUM CHLORIDE 600; 310; 30; 20 MG/100ML; MG/100ML; MG/100ML; MG/100ML
75 INJECTION, SOLUTION INTRAVENOUS CONTINUOUS
Status: DISCONTINUED | OUTPATIENT
Start: 2020-06-16 | End: 2020-06-16 | Stop reason: HOSPADM

## 2020-06-16 RX ORDER — FENTANYL CITRATE 50 UG/ML
INJECTION, SOLUTION INTRAMUSCULAR; INTRAVENOUS AS NEEDED
Status: DISCONTINUED | OUTPATIENT
Start: 2020-06-16 | End: 2020-06-16 | Stop reason: HOSPADM

## 2020-06-16 RX ORDER — KETOROLAC TROMETHAMINE 15 MG/ML
INJECTION, SOLUTION INTRAMUSCULAR; INTRAVENOUS AS NEEDED
Status: DISCONTINUED | OUTPATIENT
Start: 2020-06-16 | End: 2020-06-16 | Stop reason: HOSPADM

## 2020-06-16 RX ORDER — HYDROCODONE BITARTRATE AND ACETAMINOPHEN 5; 325 MG/1; MG/1
1 TABLET ORAL
Qty: 12 TAB | Refills: 0 | Status: SHIPPED | OUTPATIENT
Start: 2020-06-16 | End: 2020-06-19

## 2020-06-16 RX ORDER — PROPOFOL 10 MG/ML
INJECTION, EMULSION INTRAVENOUS AS NEEDED
Status: DISCONTINUED | OUTPATIENT
Start: 2020-06-16 | End: 2020-06-16 | Stop reason: HOSPADM

## 2020-06-16 RX ORDER — FAMOTIDINE 20 MG/1
20 TABLET, FILM COATED ORAL ONCE
Status: COMPLETED | OUTPATIENT
Start: 2020-06-16 | End: 2020-06-16

## 2020-06-16 RX ORDER — BUPIVACAINE HYDROCHLORIDE 2.5 MG/ML
INJECTION, SOLUTION EPIDURAL; INFILTRATION; INTRACAUDAL AS NEEDED
Status: DISCONTINUED | OUTPATIENT
Start: 2020-06-16 | End: 2020-06-16 | Stop reason: HOSPADM

## 2020-06-16 RX ORDER — HYDROCODONE BITARTRATE AND ACETAMINOPHEN 5; 325 MG/1; MG/1
1 TABLET ORAL AS NEEDED
Status: DISCONTINUED | OUTPATIENT
Start: 2020-06-16 | End: 2020-06-16 | Stop reason: HOSPADM

## 2020-06-16 RX ORDER — INSULIN LISPRO 100 [IU]/ML
INJECTION, SOLUTION INTRAVENOUS; SUBCUTANEOUS ONCE
Status: DISCONTINUED | OUTPATIENT
Start: 2020-06-16 | End: 2020-06-16 | Stop reason: HOSPADM

## 2020-06-16 RX ORDER — MAGNESIUM SULFATE 100 %
4 CRYSTALS MISCELLANEOUS AS NEEDED
Status: DISCONTINUED | OUTPATIENT
Start: 2020-06-16 | End: 2020-06-16 | Stop reason: HOSPADM

## 2020-06-16 RX ADMIN — PROPOFOL 50 MG: 10 INJECTION, EMULSION INTRAVENOUS at 07:34

## 2020-06-16 RX ADMIN — KETOROLAC TROMETHAMINE 15 MG: 15 INJECTION, SOLUTION INTRAMUSCULAR; INTRAVENOUS at 08:01

## 2020-06-16 RX ADMIN — SODIUM CHLORIDE, SODIUM LACTATE, POTASSIUM CHLORIDE, AND CALCIUM CHLORIDE 75 ML/HR: 600; 310; 30; 20 INJECTION, SOLUTION INTRAVENOUS at 06:22

## 2020-06-16 RX ADMIN — FENTANYL CITRATE 50 MCG: 50 INJECTION, SOLUTION INTRAMUSCULAR; INTRAVENOUS at 07:26

## 2020-06-16 RX ADMIN — ONDANSETRON 4 MG: 2 INJECTION INTRAMUSCULAR; INTRAVENOUS at 08:01

## 2020-06-16 RX ADMIN — FAMOTIDINE 20 MG: 20 TABLET ORAL at 06:22

## 2020-06-16 RX ADMIN — FENTANYL CITRATE 50 MCG: 50 INJECTION, SOLUTION INTRAMUSCULAR; INTRAVENOUS at 07:52

## 2020-06-16 RX ADMIN — PROPOFOL 20 MG: 10 INJECTION, EMULSION INTRAVENOUS at 07:50

## 2020-06-16 RX ADMIN — FENTANYL CITRATE 50 MCG: 50 INJECTION, SOLUTION INTRAMUSCULAR; INTRAVENOUS at 07:35

## 2020-06-16 RX ADMIN — CEFAZOLIN SODIUM 2 G: 1 INJECTION, POWDER, FOR SOLUTION INTRAMUSCULAR; INTRAVENOUS at 07:32

## 2020-06-16 RX ADMIN — PROPOFOL 50 MG: 10 INJECTION, EMULSION INTRAVENOUS at 07:28

## 2020-06-16 RX ADMIN — SODIUM CHLORIDE: 900 INJECTION, SOLUTION INTRAVENOUS at 07:20

## 2020-06-16 NOTE — H&P
Update History & Physical    The Patient's History and Physical of Monica 10,   2020 was reviewed with the patient and I examined the patient. There was no change. The surgical site was confirmed by the patient and me. Plan:  The risk, benefits, expected outcome, and alternative to the recommended procedure have been discussed with the patient. Patient understands and wants to proceed with the procedure.     Electronically signed by Zane Goddard DO on 6/16/2020 at 7:17 AM

## 2020-06-16 NOTE — PROGRESS NOTES
conducted a pre-surgery visit with Iban Andre, who is a 66 y.o.,female. The  provided the following Interventions:  Initiated a relationship of care and support. Plan:  Chaplains will continue to follow and will provide pastoral care on an as needed/requested basis.  recommends bedside caregivers page  on duty if patient shows signs of acute spiritual or emotional distress.     [address]  [phone]

## 2020-06-16 NOTE — DISCHARGE INSTRUCTIONS
Ice Elevate Thumb    Keep dressing clean and dry  -Cover with plastic bag and rubber band or tape for showering    Do not remove dressing. Gentle thumb exercises twice a day        DISCHARGE SUMMARY from Nurse    PATIENT INSTRUCTIONS:    After general anesthesia or intravenous sedation, for 24 hours or while taking prescription Narcotics:  · Limit your activities  · Do not drive and operate hazardous machinery  · Do not make important personal or business decisions  · Do  not drink alcoholic beverages  · If you have not urinated within 8 hours after discharge, please contact your surgeon on call. Report the following to your surgeon:  · Excessive pain, swelling, redness or odor of or around the surgical area  · Temperature over 100.5  · Nausea and vomiting lasting longer than 4 hours or if unable to take medications  · Any signs of decreased circulation or nerve impairment to extremity: change in color, persistent  numbness, tingling, coldness or increase pain  · Any questions    What to do at Home:  Recommended activity: Activity as tolerated and no driving for today. Follow Dr. Tracy Rogers discharge instructions. *  Please give a list of your current medications to your Primary Care Provider. *  Please update this list whenever your medications are discontinued, doses are      changed, or new medications (including over-the-counter products) are added. *  Please carry medication information at all times in case of emergency situations. These are general instructions for a healthy lifestyle:    No smoking/ No tobacco products/ Avoid exposure to second hand smoke  Surgeon General's Warning:  Quitting smoking now greatly reduces serious risk to your health.     Obesity, smoking, and sedentary lifestyle greatly increases your risk for illness    A healthy diet, regular physical exercise & weight monitoring are important for maintaining a healthy lifestyle    You may be retaining fluid if you have a history of heart failure or if you experience any of the following symptoms:  Weight gain of 3 pounds or more overnight or 5 pounds in a week, increased swelling in our hands or feet or shortness of breath while lying flat in bed. Please call your doctor as soon as you notice any of these symptoms; do not wait until your next office visit. Patient Education     Care of Closed Wounds: After Your Visit  Your Care Instructions  Stitches, staples, or tape called Steri-Strips are sometimes used to keep the edges of a cut together and help it heal right. Skin adhesives such as Dermabond are also used to close cuts. When the adhesive dries, it forms a film that holds the edges of the cut together. Skin adhesives are sometimes called liquid stitches. You may have some swelling, color changes, and bloody crusting on or around the wound for 2 or 3 days. This is normal. Taking good care of your wound at home will help it heal quickly and reduce your chance of infection. Any wound that passes through the full thickness of skin will cause a permanent scar. The scar gradually improves for about a year. Protect your healing wound from too much sunlight. Follow-up care is a key part of your treatment and safety. Be sure to make and go to all appointments, and call your doctor if you are having problems. Its also a good idea to know your test results and keep a list of the medicines you take. How can you care for yourself at home? · If possible, prop up the injured area on a pillow when you ice it or anytime you sit or lie down during the next 3 days. Try to keep it above the level of your heart. This will help reduce swelling. · Put ice or a cold pack on your wound for 10 to 20 minutes at a time. Try to do this every 1 to 2 hours for the next 3 days (when you are awake) or until the swelling goes down. Put a thin cloth between the ice pack and your skin.   · Take an over-the-counter pain medicine, such as acetaminophen (Tylenol), ibuprofen (Advil, Motrin), or naproxen (Aleve). Read and follow all instructions on the label. Some pain is normal with a wound, but do not ignore pain that is getting worse. · Do not take two or more pain medicines at the same time unless the doctor told you to. Many pain medicines have acetaminophen, which is Tylenol. Too much acetaminophen (Tylenol) can be harmful. If you have stitches, staples, or Steri-Strips:  · Leave the bandage on and do not get the wound wet for the first 24 to 48 hours. Use a plastic bag to cover the area when you shower. · After the first 24 to 48 hours, you can remove the bandage and gently wash the wound. If the bandage sticks to the wound, use warm water to loosen it. Do not scrub or soak the area. Do not go swimming. · Replace the bandage with a clean one at least once a day and whenever the old one gets wet or dirty. If a small wound is not likely to get dirty, is not draining, and is not in an area where clothing will rub it, you may not need a bandage. · Clean the wound with soap and water 2 times a day unless your doctor gives you different instructions. Don't use hydrogen peroxide or alcohol, which can slow healing. ¨ You may cover the wound with a thin layer of antibiotic ointment, such as bacitracin, and a nonstick bandage. ¨ Apply more ointment and replace the bandage as needed. · Do not remove the stitches on your own. Your doctor will tell you when to return to have the stitches removed. · Leave Steri-Strips on until they fall off. If a liquid skin adhesive was used to close the wound:  · Leave the skin adhesive on your skin until it falls off on its own. This may take 5 to 10 days. · Do not scratch, rub, or pick at the adhesive. · Do not put tape directly over the adhesive. · You can shower with a skin adhesive in place, but do not soak the area in water. Do not go swimming. Be sure to gently dry the area after it gets wet.   · Do not put any kind of ointment, cream, or lotion over the area. This can make the adhesive fall off too soon. · If the doctor bandaged the wound, keep the bandage clean and dry. Change the bandage each day until the adhesive film has fallen off or whenever the bandage gets wet or dirty. When should you call for help? Call your doctor now or seek immediate medical care if:  · The skin near the wound is cool or pale or changes color. · You have tingling, weakness, or numbness in your limb near the wound. · The wound starts to bleed, and blood soaks through the bandage. Oozing small amounts of blood is normal.  · You have trouble moving a limb near the wound. · You have signs of infection, such as:  ¨ Increased pain, swelling, warmth, or redness around the wound. ¨ Red streaks leading from the wound. ¨ Pus draining from the wound. ¨ A fever. Watch closely for changes in your health, and be sure to contact your doctor if:  · The wound is not getting better each day. Where can you learn more? Go to [employer]  Enter A341 in the search box to learn more about \"Care of Closed Wounds: After Your Visit. \"   © 9480-0541 [employer]. Care instructions adapted under license by [employer] (which disclaims liability or warranty for this information). This care instruction is for use with your licensed healthcare professional. If you have questions about a medical condition or this instruction, always ask your healthcare professional. [place] [address] any warranty or liability for your use of this information. Content Version: 13.9.881958; Last Revised: May 17, 2013               Patient Education   Hydrocodone/Acetaminophen (By mouth)   Acetaminophen (y-ammg-w-MIN-oh-fen), Hydrocodone Bitartrate (kjo-jhre-QML-done bye-TAR-trate)  Treats pain. This medicine contains a narcotic pain reliever.    Brand Name(s): Hycet, Lorcet, Lorcet HD, Lorcet Plus, Lortab 10/325, Lortab 5/325, Lortab 7.5/325, Lortab Elixir, Norco, Verdrocet, Vicodin, Vicodin ES, Vicodin HP, Leotis Clas 5/300   There may be other brand names for this medicine. When This Medicine Should Not Be Used: This medicine is not right for everyone. Do not use it if you had an allergic reaction to acetaminophen, hydrocodone, or other narcotic medicines, or stomach or bowel blockage (including paralytic ileus). How to Use This Medicine:   Capsule, Liquid, Tablet  · Your doctor will tell you how much medicine to use. Do not use more than directed. · An overdose can be dangerous. Follow directions carefully so you do not get too much medicine at one time. · Oral liquid: Measure the oral liquid medicine with a marked measuring spoon, oral syringe, or medicine cup. · Drink plenty of liquids to help avoid constipation. · This medicine should come with a Medication Guide. Ask your pharmacist for a copy if you do not have one. · Missed dose: Take a dose as soon as you remember. If it is almost time for your next dose, wait until then and take a regular dose. Do not take extra medicine to make up for a missed dose. · Store the medicine in a closed container at room temperature, away from heat, moisture, and direct light. Flush any unused Norco® tablets down the toilet. Drugs and Foods to Avoid:   Ask your doctor or pharmacist before using any other medicine, including over-the-counter medicines, vitamins, and herbal products. · Do not use this medicine if you are using or have used an MAO inhibitor within the past 14 days. · Some medicines can affect how hydrocodone/acetaminophen works.  Tell your doctor if you are using any of the following:   ¨ Carbamazepine, erythromycin, ketoconazole, mirtazapine, phenytoin, rifampin, ritonavir, tramadol, trazodone  ¨ Diuretic (water pill)  ¨ Medicine to treat depression or mental health problems  ¨ Medicine to treat migraine headaches  ¨ Phenothiazine medicine  · Tell your doctor if you use anything else that makes you sleepy. Some examples are allergy medicine, narcotic pain medicine, and alcohol. Tell your doctor if you are using buprenorphine, butorphanol, nalbuphine, pentazocine, or a muscle relaxer. · Do not drink alcohol while you are using this medicine. Acetaminophen can damage your liver, and your risk is higher if you also drink alcohol. Warnings While Using This Medicine:   · Tell your doctor if you are pregnant or breastfeeding, or if you have kidney disease, liver disease, lung or breathing problems, gallbladder or pancreas problems, an underactive thyroid, [place] disease, prostate problems, trouble urinating, stomach problems, or a history of head injury or brain tumor, seizures, alcohol or drug addiction. · This medicine may cause the following problems:   ¨ High risk of overdose, which can lead to death  ¨ Respiratory depression (serious breathing problem that can be life-threatening)  ¨ Liver problems  ¨ Serious skin reactions  ¨ Serotonin syndrome (when used with certain medicines)  · This medicine can be habit-forming. Do not use more than your prescribed dose. Call your doctor if you think your medicine is not working. · This medicine may make you dizzy or drowsy. Do not drive or doing anything else that could be dangerous until you know how this medicine affects you. · This medicine contains acetaminophen. Read the labels of all other medicines you are using to see if they also contain acetaminophen, or ask your doctor or pharmacist. Armand Rodriguez not use more than 4 grams (4,000 milligrams) total of acetaminophen in one day. · Tell any doctor or dentist who treats you that you are using this medicine. This medicine may affect certain medical test results. · This medicine may cause constipation, especially with long-term use. Ask your doctor if you should use a laxative to prevent and treat constipation. · This medicine could cause infertility.  Talk with your doctor before using this medicine if you plan to have children. · Keep all medicine out of the reach of children. Never share your medicine with anyone. Possible Side Effects While Using This Medicine:   Call your doctor right away if you notice any of these side effects:  · Allergic reaction: Itching or hives, swelling in your face or hands, swelling or tingling in your mouth or throat, chest tightness, trouble breathing  · Anxiety, restlessness, fast heartbeat, fever, sweating, muscle spasms, twitching, diarrhea, seeing or hearing things that are not there  · Blistering, peeling, red skin rash  · Blue lips, fingernails, or skin  · Dark urine or pale stools, loss of appetite, nausea or vomiting, stomach pain, yellow skin or eyes  · Extreme weakness, shallow breathing, slow heartbeat, sweating, seizures, cold or clammy skin  · Lightheadedness, dizziness, fainting  If you notice these less serious side effects, talk with your doctor:   · Constipation, nausea, vomiting  · Tiredness or sleepiness  If you notice other side effects that you think are caused by this medicine, tell your doctor. Call your doctor for medical advice about side effects. You may report side effects to FDA at 1-178-FDA-3637  © 2017 [facility] Information is for End User's use only and may not be sold, redistributed or otherwise used for commercial purposes. The above information is an  only. It is not intended as medical advice for individual conditions or treatments. Talk to your doctor, nurse or pharmacist before following any medical regimen to see if it is safe and effective for you. The discharge information has been reviewed with the patient. The patient verbalized understanding.   Discharge medications reviewed with the patient and appropriate educational materials and side effects teaching were provided.   ___________________________________________________________________________________________________________________________________

## 2020-06-16 NOTE — ANESTHESIA PREPROCEDURE EVALUATION
Relevant Problems   No relevant active problems       Anesthetic History   No history of anesthetic complications            Review of Systems / Medical History  Patient summary reviewed, nursing notes reviewed and pertinent labs reviewed    Pulmonary  Within defined limits                 Neuro/Psych   Within defined limits           Cardiovascular    Hypertension: poorly controlled            Pertinent negatives: No past MI and dysrhythmias  Exercise tolerance: >4 METS     GI/Hepatic/Renal     GERD: well controlled           Endo/Other  Within defined limits           Other Findings              Physical Exam    Airway  Mallampati: III  TM Distance: 4 - 6 cm  Neck ROM: normal range of motion   Mouth opening: Normal     Cardiovascular    Rhythm: regular  Rate: normal         Dental    Dentition: Caps/crowns     Pulmonary  Breath sounds clear to auscultation               Abdominal  GI exam deferred       Other Findings            Anesthetic Plan    ASA: 2  Anesthesia type: general and MAC          Induction: Intravenous  Anesthetic plan and risks discussed with: Patient

## 2020-06-16 NOTE — BRIEF OP NOTE
Brief Postoperative Note    Patient: Donnamarie Osler  YOB: 1945  MRN: [id number]    Date of Procedure: 6/16/2020     Pre-Op Diagnosis: R22.32 SUBCUTANEOUS MASS OF LEFT THUMB    Post-Op Diagnosis: Same as preoperative diagnosis. Procedure(s):  LEFT THUMB MASS EXCISION    Surgeon(s):   Mayur RODRIGUEZ DO    Surgical Assistant: None    Anesthesia: MAC + Local    Estimated Blood Loss (mL): Minimal    Complications: None    Specimens:   ID Type Source Tests Collected by Time Destination   1 : LEFT thumb mass Preservative Hand  Rene NathanDO rian 6/16/2020 0748 Pathology        Implants: * No implants in log *    Drains: * No LDAs found *    Findings: superficial mass with a mass twice as large deep to fascia    Electronically Signed by Nirali Moss DO on 6/16/2020 at 8:02 AM

## 2020-06-16 NOTE — OP NOTES
[facility]  OPERATIVE REPORT    Name:  Sorin Morales  MR#:   [id number]  :  1945  ACCOUNT #:  [de-identified]  DATE OF SERVICE:  2020    PREOPERATIVE DIAGNOSIS:  Subcutaneous mass of left thumb. POSTOPERATIVE DIAGNOSIS:  Subcutaneous mass of left thumb with deep extension that is subfascial.    PROCEDURE PERFORMED:  Left thumb mass excision. Nelia 78:  Aden Bolden DO    ASSISTANT:  None. ANESTHESIA:  MAC and local.    COMPLICATIONS:  None. SPECIMENS REMOVED:  Left thumb mass. IMPLANTS:  None. ESTIMATED BLOOD LOSS:  Minimal.    DRAINS:  None. FINDINGS:  Superficial mass with the mass twice as large deep to the fascia. Informed consent was obtained from the patient. The risks and benefits of the procedure were discussed with the patient. They include but are not limited to neurovascular injury, blood loss, infection, incomplete removal of mass, recurrence of mass, chronic pain, possibility of contracture, contraction of COVID virus as well as complications from anesthesia including death. PROCEDURE:  After informed consent was obtained from the patient, she was taken back to the operative suite. A tourniquet was applied to the left upper extremity, and it was prepped and draped in the normal sterile fashion. Prior to prepping and draping, approximately 10 mL of 0.25% Marcaine was injected into the subcutaneous tissues in the form of a digital block of the thumb. After draping, the arm was elevated, exsanguinated, and the tourniquet was elevated to 250 mmHg. An incision was made around the mass as well as proximal to it. Using a curette, the superficial aspect of the mass was removed. At this point, the mass was clamped and was dissected off of the underlying fascia. Deep to this fascia was the continuation of the mass deep to the extensor tendon fascia. This was also removed and excised.   Electrocautery was used for hemostasis as well as to cauterize the root of the mass. The wound was copiously irrigated, and the tourniquet was let down. Electrocautery was again used to assist in hemostasis. The wound was closed with 5-0 chromic in interrupted fashion, and the mass was sent as a specimen. The wound was dressed with a sterile dressing, and the patient was given appropriate wound care instructions, followup with me in the outpatient setting, and a prescription for pain medicine. She was sent to recovery in stable condition.       DO BRIJESH [id number]  D:  06/16/2020 8:13  T:  06/16/2020 12:37  JOB #:  [id number]

## 2020-06-16 NOTE — ANESTHESIA POSTPROCEDURE EVALUATION
Procedure(s):  LEFT THUMB MASS EXCISION. MAC    Anesthesia Post Evaluation      Multimodal analgesia: multimodal analgesia used between 6 hours prior to anesthesia start to PACU discharge  Patient location during evaluation: bedside  Patient participation: complete - patient participated  Level of consciousness: awake  Pain score: 0  Pain management: adequate  Airway patency: patent  Anesthetic complications: no  Cardiovascular status: blood pressure returned to baseline and stable  Respiratory status: acceptable, spontaneous ventilation and unassisted  Hydration status: euvolemic  Post anesthesia nausea and vomiting:  none      INITIAL Post-op Vital signs:   Vitals Value Taken Time   /78 6/16/2020  8:26 AM   Temp 36.5 °C (97.7 °F) 6/16/2020  8:08 AM   Pulse 66 6/16/2020  8:29 AM   Resp 16 6/16/2020  8:29 AM   SpO2 96 % 6/16/2020  8:29 AM   Vitals shown include unvalidated device data.

## 2020-07-01 ENCOUNTER — OFFICE VISIT (OUTPATIENT)
Dept: ORTHOPEDIC SURGERY | Facility: CLINIC | Age: 75
End: 2020-07-01

## 2020-07-01 VITALS
HEIGHT: 62 IN | WEIGHT: 175 LBS | TEMPERATURE: 96.9 F | DIASTOLIC BLOOD PRESSURE: 92 MMHG | BODY MASS INDEX: 32.2 KG/M2 | SYSTOLIC BLOOD PRESSURE: 152 MMHG | RESPIRATION RATE: 16 BRPM | HEART RATE: 56 BPM | OXYGEN SATURATION: 98 %

## 2020-07-01 DIAGNOSIS — R22.32 SUBCUTANEOUS MASS OF LEFT THUMB: ICD-10-CM

## 2020-07-01 DIAGNOSIS — D21.12 FIBROMA OF FINGER OF LEFT HAND: Primary | ICD-10-CM

## 2020-07-01 NOTE — PROGRESS NOTES
Jessi Do is a 66 y.o. female right handed retiree. Worker's Compensation and legal considerations: none filed. Vitals:    07/01/20 0931   BP: (!) 152/92   Pulse: (!) 56   Resp: 16   Temp: 96.9 °F (36.1 °C)   TempSrc: Oral   SpO2: 98%   Weight: 175 lb (79.4 kg)   Height: 5' 2\" (1.575 m)   PainSc:   0 - No pain   PainLoc: Hand           Chief Complaint   Patient presents with    Hand Pain     left thumb pain     HPI:  2 wks PO left thumb mass excision. Minimal Pain. Preop HPI: Patient comes in today for preoperative history and physical of her left thumb mass. She reports no change. Initial HPI: Patient comes in today with complaints of a mass over her left thumb. She is unsure how long it is been there but she says she has had a similar mass in her genitalia as well as she notices one appearing on the inside of her lip. Date of onset:  indeterminate    Injury: No    Prior Treatment:  No    Numbness/ Tingling: No    ROS: Review of Systems - General ROS: negative  Respiratory ROS: no cough, shortness of breath, or wheezing  Cardiovascular ROS: no chest pain or dyspnea on exertion  Musculoskeletal ROS: positive for - mass left thumb  Neurological ROS: negative  Dermatological ROS: negative    Past Medical History:   Diagnosis Date    GERD (gastroesophageal reflux disease)     Hypercholesteremia     Hypertension        Past Surgical History:   Procedure Laterality Date    HX HYSTERECTOMY         Current Outpatient Medications   Medication Sig Dispense Refill    atorvastatin (LIPITOR) 40 mg tablet 40 mg daily.  losartan (COZAAR) 50 mg tablet 50 mg daily.  cetirizine (ZYRTEC) 10 mg tablet Take 10 mg by mouth daily as needed.  oxybutynin (DITROPAN) 5 mg tablet as needed.  pantoprazole (PROTONIX) 40 mg tablet Take 40 mg by mouth daily as needed.  furosemide (LASIX) 20 mg tablet Take 20 mg by mouth daily as needed.       ergocalciferol (VITAMIN D2) 50,000 unit capsule Take 50,000 Units by mouth every seven (7) days. No Known Allergies      PE:     Physical Exam  Vitals signs and nursing note reviewed. Constitutional:       General: She is not in acute distress. Appearance: Normal appearance. She is not ill-appearing. Eyes:      Pupils: Pupils are equal, round, and reactive to light. Neck:      Musculoskeletal: Normal range of motion. Cardiovascular:      Pulses: Normal pulses. Pulmonary:      Effort: Pulmonary effort is normal. No respiratory distress. Abdominal:      General: Abdomen is flat. Musculoskeletal: Normal range of motion. General: Tenderness present. Skin:     General: Skin is warm. Capillary Refill: Capillary refill takes less than 2 seconds. Coloration: Skin is not jaundiced. Neurological:      General: No focal deficit present. Mental Status: She is alert and oriented to person, place, and time. Sensory: No sensory deficit. Psychiatric:         Mood and Affect: Mood normal.         Behavior: Behavior normal.         Left Hand: Again there is a wartlike appearing mass on the dorsal aspect of the thumb. It is nontender to palpation. Sensation is intact distally and capillary refill is brisk. Pathology:  Consistent with neurofibroma   MICROSCOPIC DESCRIPTION:   Sections show a circumscribed nodule composed of spindle-shaped cells with notably bland nuclear features, in a rather dense collagenous background. Immunohistochemical studies using antibody directed against S100 protein are performed to address the possibility of neurofibroma. These studies show labeling of the spindle-shaped cells, the findings being confirmatory of neurofibroma. There is no evidence of malignancy. Imaging:     Previous Plain films of left thumb does not show any osseous abnormality although there are degenerative changes noted at the thumb [address]ia 16 joint. ICD-10-CM ICD-9-CM    1.  Fibroma of finger of left hand D21.12 215.2        Plan:     Scar Care and edema control      Follow-up and Dispositions    · Return in about 4 weeks (around 7/29/2020) for Reevaluation.          Plan was reviewed with patient, who verbalized agreement and understanding of the plan

## 2020-07-29 ENCOUNTER — OFFICE VISIT (OUTPATIENT)
Dept: ORTHOPEDIC SURGERY | Facility: CLINIC | Age: 75
End: 2020-07-29

## 2020-07-29 VITALS
HEIGHT: 62 IN | OXYGEN SATURATION: 99 % | DIASTOLIC BLOOD PRESSURE: 87 MMHG | BODY MASS INDEX: 32.31 KG/M2 | TEMPERATURE: 97 F | WEIGHT: 175.6 LBS | HEART RATE: 68 BPM | SYSTOLIC BLOOD PRESSURE: 156 MMHG | RESPIRATION RATE: 12 BRPM

## 2020-07-29 DIAGNOSIS — D21.12 FIBROMA OF FINGER OF LEFT HAND: Primary | ICD-10-CM

## 2020-07-29 NOTE — PROGRESS NOTES
Paige Humphries is a 66 y.o. female right handed retiree. Worker's Compensation and legal considerations: none filed. Vitals:    07/29/20 0904   BP: 156/87   Pulse: 68   Resp: 12   Temp: 97 °F (36.1 °C)   TempSrc: Temporal   SpO2: 99%   Weight: 175 lb 9.6 oz (79.7 kg)   Height: 5' 2\" (1.575 m)   PainSc:   0 - No pain   PainLoc: Hand           Chief Complaint   Patient presents with    Hand Pain     Left       HPI: Patient comes in today for second postoperative appointment approximately 6 weeks status post left thumb mass excision. She reports minimal pain or tenderness and only feels it if she bumps it. She does report some discoloration at the scar. 2wk PO HPI:  2 wks PO left thumb mass excision. Minimal Pain. Preop HPI: Patient comes in today for preoperative history and physical of her left thumb mass. She reports no change. Initial HPI: Patient comes in today with complaints of a mass over her left thumb. She is unsure how long it is been there but she says she has had a similar mass in her genitalia as well as she notices one appearing on the inside of her lip. Date of onset:  indeterminate    Injury: No    Prior Treatment:  No    Numbness/ Tingling: No    ROS: Review of Systems - General ROS: negative  Respiratory ROS: no cough, shortness of breath, or wheezing  Cardiovascular ROS: no chest pain or dyspnea on exertion  Musculoskeletal ROS: positive for - mass left thumb  Neurological ROS: negative  Dermatological ROS: negative    Past Medical History:   Diagnosis Date    GERD (gastroesophageal reflux disease)     Hypercholesteremia     Hypertension        Past Surgical History:   Procedure Laterality Date    HX HYSTERECTOMY         Current Outpatient Medications   Medication Sig Dispense Refill    atorvastatin (LIPITOR) 40 mg tablet 40 mg daily.  losartan (COZAAR) 50 mg tablet 50 mg daily.  cetirizine (ZYRTEC) 10 mg tablet Take 10 mg by mouth daily as needed.       oxybutynin (DITROPAN) 5 mg tablet as needed.  pantoprazole (PROTONIX) 40 mg tablet Take 40 mg by mouth daily as needed.  furosemide (LASIX) 20 mg tablet Take 20 mg by mouth daily as needed.  ergocalciferol (VITAMIN D2) 50,000 unit capsule Take 50,000 Units by mouth every seven (7) days. No Known Allergies      PE:     Physical Exam  Vitals signs and nursing note reviewed. Constitutional:       General: She is not in acute distress. Appearance: Normal appearance. She is not ill-appearing. Eyes:      Pupils: Pupils are equal, round, and reactive to light. Neck:      Musculoskeletal: Normal range of motion. Cardiovascular:      Pulses: Normal pulses. Pulmonary:      Effort: Pulmonary effort is normal. No respiratory distress. Abdominal:      General: Abdomen is flat. Musculoskeletal: Normal range of motion. General: Tenderness present. Skin:     General: Skin is warm. Capillary Refill: Capillary refill takes less than 2 seconds. Coloration: Skin is not jaundiced. Neurological:      General: No focal deficit present. Mental Status: She is alert and oriented to person, place, and time. Sensory: No sensory deficit. Psychiatric:         Mood and Affect: Mood normal.         Behavior: Behavior normal.         Left Hand: Incision is healed with no tenderness noted. Sensation is grossly intact distally and cap refill is brisk. There is some hyperpigmentation around the incision as well as some deep scar tissue palpated. Pathology:  Consistent with neurofibroma   MICROSCOPIC DESCRIPTION:   Sections show a circumscribed nodule composed of spindle-shaped cells with notably bland nuclear features, in a rather dense collagenous background. Immunohistochemical studies using antibody directed against S100 protein are performed to address the possibility of neurofibroma.  These studies show labeling of the spindle-shaped cells, the findings being confirmatory of neurofibroma. There is no evidence of malignancy. Imaging:     Previous Plain films of left thumb does not show any osseous abnormality although there are degenerative changes noted at the thumb [facility] joint. ICD-10-CM ICD-9-CM    1. Fibroma of finger of left hand  D21.12 215.2        Plan:     Again I have instructed the patient on scar care and massage. I have also told her to try and get some scar cream or vitamin E oil for her incision. Follow-up and Dispositions    · Return if symptoms worsen or fail to improve.          Plan was reviewed with patient, who verbalized agreement and understanding of the plan

## (undated) DEVICE — GARMENT,MEDLINE,DVT,SEQUENTIAL,CALF,MD: Brand: MEDLINE

## (undated) DEVICE — BNDG CMPR ELAS KNT VEL STD 3IN -- MEDICHOICE

## (undated) DEVICE — SYR 10ML LUER LOK 1/5ML GRAD --

## (undated) DEVICE — INTENDED FOR TISSUE SEPARATION, AND OTHER PROCEDURES THAT REQUIRE A SHARP SURGICAL BLADE TO PUNCTURE OR CUT.: Brand: BARD-PARKER SAFETY BLADES SIZE 10, STERILE

## (undated) DEVICE — THREE-QUARTER SHEET: Brand: CONVERTORS

## (undated) DEVICE — SOLUTION IV 1000ML 0.9% SOD CHL

## (undated) DEVICE — STERILE POLYISOPRENE POWDER-FREE SURGICAL GLOVES: Brand: PROTEXIS

## (undated) DEVICE — BANDAGE,GAUZE,CONFORMING,1"X75",STRL,LF: Brand: MEDLINE

## (undated) DEVICE — DERMACEA GAUZE ROLL: Brand: DERMACEA

## (undated) DEVICE — BANDAGE COMPR 9 FTX4 IN SMOOTH COMFORTABLE SYNTH ESMRK LF

## (undated) DEVICE — BIPOLAR FORCEPS CORD: Brand: VALLEYLAB

## (undated) DEVICE — SHEET,DRAPE,70X100,STERILE: Brand: MEDLINE

## (undated) DEVICE — CANNULA PERF L2IN BLNT TIP 2MM VES CLR RADPQ BODY FEM LUER

## (undated) DEVICE — PACK PROCEDURE SURG MAJ W/ BASIN LF

## (undated) DEVICE — PAD,NON-ADHERENT,3X8,STERILE,LF,1/PK: Brand: MEDLINE

## (undated) DEVICE — GAUZE,SPONGE,4"X4",16PLY,STRL,LF,10/TRAY: Brand: MEDLINE

## (undated) DEVICE — SYR LR LCK 1ML GRAD NSAF 30ML --

## (undated) DEVICE — BANDAGE COBAN 4 IN COMPR W4INXL5YD FOAM COHESIVE QUIK STK SELF ADH SFT

## (undated) DEVICE — ZIMMER® STERILE DISPOSABLE TOURNIQUET CUFF WITH PROTECTIVE SLEEVE AND PLC, DUAL PORT, SINGLE BLADDER, 18 IN. (46 CM)

## (undated) DEVICE — PADDING CAST W4INXL4YD ST COT COHESIVE HND TEARABLE SPEC

## (undated) DEVICE — SUTURE CHROMIC GUT SZ 5-0 L18IN ABSRB BRN L16MM PS-3 3/8 1636G

## (undated) DEVICE — STOCKINETTE,IMPERVIOUS,12X48,STERILE: Brand: MEDLINE

## (undated) DEVICE — KIT CLN UP BON SECOURS MARYV

## (undated) DEVICE — BNDG ELAS HK LOOP 4X5YD NS -- MATRIX

## (undated) DEVICE — (D)PREP SKN CHLRAPRP APPL 26ML -- CONVERT TO ITEM 371833

## (undated) DEVICE — BLANKET WRM AD W50XL85.8IN PACU FULL BODY FORC AIR

## (undated) DEVICE — DRESSING,GAUZE,XEROFORM,CURAD,1"X8",ST: Brand: CURAD

## (undated) DEVICE — GARMENT,MEDLINE,DVT,INT,CALF,MED, GEN2: Brand: MEDLINE

## (undated) DEVICE — GOWN,REINFORCE,POLY,SIRUS,SETSLV,XLARGE: Brand: MEDLINE